# Patient Record
Sex: FEMALE | Race: WHITE | Employment: UNEMPLOYED | URBAN - METROPOLITAN AREA
[De-identification: names, ages, dates, MRNs, and addresses within clinical notes are randomized per-mention and may not be internally consistent; named-entity substitution may affect disease eponyms.]

---
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
## 2024-06-13 ENCOUNTER — HOSPITAL ENCOUNTER (OUTPATIENT)
Facility: HOSPITAL | Age: 24
Setting detail: RECURRING SERIES
Discharge: HOME | End: 2024-06-16
Payer: COMMERCIAL

## 2024-06-13 PROCEDURE — 97802 MEDICAL NUTRITION INDIV IN: CPT

## 2024-06-13 NOTE — PROGRESS NOTES
cholesterol levels  []  Decreased blood pressure  []  Weight maintenance []  Preventing any interactions associated with food allergies  []  Adequate weight gain toward goal weight  []  Other:        Patient Goals:   - Continue food log, add notes about bowel habits  - Increase protein from foods by planning ahead for meals and snacks during work   - Get in 2 servings of fruit and 3 servings of vegetables per day to help increase fiber intake.      Dietitian Signature: Britney Brock,MPH, RDN Date: 6/13/2024   Follow-up: 2-4 weeks w/ Yoko Banuelos RDN Time: 1000am

## 2024-07-17 ENCOUNTER — HOSPITAL ENCOUNTER (OUTPATIENT)
Facility: HOSPITAL | Age: 24
Setting detail: RECURRING SERIES
Discharge: HOME | End: 2024-07-20
Payer: COMMERCIAL

## 2024-07-17 PROCEDURE — 97803 MED NUTRITION INDIV SUBSEQ: CPT | Performed by: DIETITIAN, REGISTERED

## 2024-07-17 NOTE — PROGRESS NOTES
NUTRITION - FOLLOW-UP TREATMENT NOTE  Patient Name: Elyssa Acharya         Date: 2024  : 2000    YES Patient  Verified  Diagnosis:     N91.2 (ICD-10-CM) - Amenorrhea, unspecified       In time:   9:05am             Out time:   9:45am   Total Treatment Time (min):   40     SUBJECTIVE/ASSESSMENT  Current Wt: 128.2 Previous Wt: 130 (self reported) Wt Change: NA     Initial Wt: 130 (self reported) Total Wt change: NA Height: 61     Changes in medication or medical history? Any new allergies, surgeries or procedures?    No    If yes, update Summary List             Nutrition Diagnosis        Diagnosis Status: New: inconsistent carbohydrate intake R/T perceived time barriers for planning and preparing foods with carbohydrate AEB dietary recall showing minimal grains consumed until dinner, minimal carbohydrates at breakfast and lunch. Reports no avoidance of carbohydrate foods, time and planning limitations for packing food for at work.     Food and nutrition related knowledge deficit R/T lack of prior education for nutrition needed for running AEB request for counseling     [x]  Improved []  No Change    []  Declined   []  Discontinued        Nutrition Monitoring and Evaluation: Menstrual history:   Last period: May 2024 (regular flow for multiple days), 2024 (light flow)   On birth control for multiple years. Off since 2023.   But did not have her period until  January progesterone test - withdrawal bleed.   Irregular periods before birth control.   First menstrual cycle: 16 yo    Saw Dr. Prieto for bone stress reaction. Still having some runs where it hurts.   Talks with Pts at work. Back to moderate training currently.   Concerns for possible over training with stress reaction and lack of regular menses.   Works as a rehab tech.   History of knee surgery. .     Weight history: <5 lb fluctuation over past 5+ years.     Activity:  Wants to train for half marathon.   Shifting towards

## 2024-08-16 ENCOUNTER — HOSPITAL ENCOUNTER (OUTPATIENT)
Facility: HOSPITAL | Age: 24
Setting detail: RECURRING SERIES
Discharge: HOME | End: 2024-08-19
Payer: COMMERCIAL

## 2024-08-16 PROCEDURE — 97803 MED NUTRITION INDIV SUBSEQ: CPT | Performed by: DIETITIAN, REGISTERED

## 2024-08-16 NOTE — PROGRESS NOTES
runs.     Dietary patterns:  Since last visit has added carb source to lunch and snacks more often. Pretzels and rice added most often. Less consistently included with breakfast . Focused more on including protein at breakfast.   normally running in mornings and not fueling before. Hungry half way through a long run (10-12 miles). Consuming Nuun electrolyte tablet with water. If waking up earlier, generally has about 30 min before running. Concern for what to consume to prevent GI issues.     Digestive issues:   Some improvements. Nuun tablet with caffeine has helped with morning bowel movement. Not always feeing fully evacuated. No straining or pain. Less often than before. Most days at least 1 bowel movement.   Urine color - sticky note yellow. No clear.   No food triggers noted for GI issues    Previous goals:  Most days. -lunch: add rice or potatoes 2-3 times per week   Packing pretzles-snack: pack another snack for work 2-3 times per week.   Met. -add crackers or cheerios to bag of nuts for snack  Sometimes focused on the protein. More often having bread. -breakfast: add grain/starchy 2-3 times per week        Nutrition Prescription and  Intervention HBE x 1.7-1.8 = 5810-2067 kcal (active job 5 days per week + 30 miles per week of running + 2 days lifting per week)   Protein = 1.4-g/kg = 82    Macronutrient g/lg Total g Kcal %   Carb Needs 6 355 1418 58%   Protein Needs 1.4 83 331 14%   Fat Needs 1.3 77 691 28%   Total     2440       [employer] Athlete's Plate. Set Moderate Plate as baseline due to high activity job and training routine.   reviewed ideas for adding grains and starchy vegetables to meals.   Self monitoring with food log. Not adding volume estimates at this time.   Will address portions and digestive issues at next session.   Educated on fueling timeline and targets for eating before and during activity. 30-60g/hr cho     Patient Education:  [x]  Review current plan with patient   []  Other:

## 2024-10-02 ENCOUNTER — HOSPITAL ENCOUNTER (OUTPATIENT)
Facility: HOSPITAL | Age: 24
Setting detail: RECURRING SERIES
Discharge: HOME | End: 2024-10-05
Payer: COMMERCIAL

## 2024-10-02 PROCEDURE — 97803 MED NUTRITION INDIV SUBSEQ: CPT | Performed by: DIETITIAN, REGISTERED

## 2024-10-02 NOTE — PROGRESS NOTES
NUTRITION - FOLLOW-UP TREATMENT NOTE  Patient Name: Elyssa Acharya         Date: 10/2/2024  : 2000    YES Patient  Verified  Diagnosis: Dietary Counseling Z71.3    N91.2 (ICD-10-CM) - Amenorrhea, unspecified       In time:  8:57am          Out time:  9:45am   Total Treatment Time (min): 48     SUBJECTIVE/ASSESSMENT  Current Wt: 130 Previous Wt: 130 Wt Change: 0     Initial Wt: 130 (self reported) Total Wt change: NA Height: 61     Changes in medication or medical history? Any new allergies, surgeries or procedures?    No    If yes, update Summary List             Nutrition Diagnosis        Diagnosis Status: New: inconsistent carbohydrate intake R/T perceived time barriers for planning and preparing foods with carbohydrate AEB dietary recall showing minimal grains consumed until dinner, minimal carbohydrates at breakfast and lunch. Reports no avoidance of carbohydrate foods, time and planning limitations for packing food for at work.   [x]  Improved []  No Change    []  Declined   []  Discontinued       Food and nutrition related knowledge deficit R/T lack of prior education for nutrition needed for running AEB request for counseling     [x]  Improved []  No Change    []  Declined   []  Discontinued        Nutrition Monitoring and Evaluation: Menstrual history:   Last period: 2024 (2 month since previous= 55 day cycle)  previous period: May 2024 (regular flow for multiple days), 2024 (light flow), 2024 (light flow)  On birth control for multiple years. Off since 2023.   But did not have her period until  January progesterone test - withdrawal bleed.   Irregular periods before birth control.   First menstrual cycle: 16 yo    Weight history: <5 lb fluctuation over past 5+ years.     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 (Increase from 30-32 miles)   Aware of the 10% rule.   Strength training 1-2 days per week. Was 2-3 and previously 4-5 times per week with time